# Patient Record
(demographics unavailable — no encounter records)

---

## 2024-10-31 NOTE — HISTORY OF PRESENT ILLNESS
[FreeTextEntry1] : Chief complaint incontinence Prolapsed uterus  Will see Dr Cantrell [de-identified] : Needs mammogram and ultrasound  No medication. Uses Vitamins

## 2024-10-31 NOTE — ASSESSMENT
[FreeTextEntry1] : Stable exam; Will get all labs' Mammogram and US of Breast Also US abdomen and Pelvis  Further plans after review of studies

## 2024-10-31 NOTE — HEALTH RISK ASSESSMENT
[Good] : ~his/her~  mood as  good [Yes] : Yes [No falls in past year] : Patient reported no falls in the past year [0] : 2) Feeling down, depressed, or hopeless: Not at all (0) [[id number]] : 0

## 2024-11-14 NOTE — ASSESSMENT
[FreeTextEntry1] : 75 yo female with prolapse.  Options for management of pelvic organ prolapse discussed at length with the patient today. Conservative options including total for physical therapy and pessary were discussed with the patient. Surgical options discussed with the patient including both transabdominal and transvaginal routes with or without mesh augmentation were discussed with the patient. Both laparoscopic robotic and open options discussed with the transabdominal route. FDA advisory for the use of mesh and prolapse surgery were discussed with the patient at length. RBAPC of our procedure was discussed at length appeared. Risks including, but not limited to, injury to the bladder, injury to the urethra, injury to bowel, injury to nerves and vascular structures, bleeding, infections, persistent or recurrent prolapse, pelvic/abdominal/vaginal pain, dyspareunia, fistula formation, ureteral injury, and dysfunctional voiding were discussed with the patient at length. After all the above was discussed and all questions answered she decided to move forward with a pessary trial.    The total amount of time I have personally spent preparing for this visit, reviewing the patient's test results, obtaining external history, ordering tests/medications, documenting clinical information, communicating with and counseling the patient/family and/or caregiver(s), reviewing old records, and spent face to face with the patient explaining the above was 45 minutes.

## 2024-11-14 NOTE — HISTORY OF PRESENT ILLNESS
[FreeTextEntry1] : 76F presents for initial evaluation of prolapse and incontinence Referred by Dr. Wise   PMH significant for: none PSH significant for: none Significant meds: none   Patient reports bladder is falling down. Noticed it a few months ago. Feels a bulge in the vagina, splinting.  Reports having urgency when bladder is full, otherwise she sometimes has UUI. No JUANIS. No gross hematuria. No hx of kidney stones. No hx of recurrent UTIs. Currently taking Cipro for UTI that was found on PE by PCP.  Daytime Frequency: 3 Nighttime Frequency: 1 Pads per day: 1 Straining to void: no Subjective Incomplete Emptying: no    Heaviness: yes Hysterectomy: no Pain with [place]: no  Daily Fluid Total: 2 glasses of water Daily Caffeine Total: 1 cup   Fecal Incontinence/Constipation: 1 BM/daily

## 2024-11-14 NOTE — HISTORY OF PRESENT ILLNESS
[FreeTextEntry1] : 76F presents for initial evaluation of prolapse and incontinence Referred by Dr. Wise   PMH significant for: none PSH significant for: none Significant meds: none   Patient reports bladder is falling down. Noticed it a few months ago. Feels a bulge in the vagina, splinting.  Reports having urgency when bladder is full, otherwise she sometimes has UUI. No JUANIS. No gross hematuria. No hx of kidney stones. No hx of recurrent UTIs. Currently taking Cipro for UTI that was found on PE by PCP.  Daytime Frequency: 3 Nighttime Frequency: 1 Pads per day: 1 Straining to void: no Subjective Incomplete Emptying: no    Heaviness: yes Hysterectomy: no Pain with [place]: no  Daily Fluid Total: 2 glasses of water Daily Caffeine Total: 1 cup   Fecal Incontinence/Constipation: 1 BM/daily

## 2024-12-09 NOTE — PHYSICAL EXAM
[General Appearance - Well Developed] : well developed [General Appearance - Well Nourished] : well nourished [Normal Appearance] : normal appearance [Well Groomed] : well groomed [General Appearance - In No Acute Distress] : no acute distress [] : no respiratory distress [Exaggerated Use Of Accessory Muscles For Inspiration] : no accessory muscle use [Normal Station and Gait] : the gait and station were normal for the patient's age [Skin Color & Pigmentation] : normal skin color and pigmentation [No Focal Deficits] : no focal deficits [Oriented To Time, Place, And Person] : oriented to person, place, and time [Affect] : the affect was normal [Mood] : the mood was normal [Not Anxious] : not anxious [Chaperone Present] : A chaperone was present in the examining room during all aspects of the physical examination [70672] : A chaperone was present during the pelvic exam. [FreeTextEntry2] : Melissa Fernandez MA

## 2024-12-09 NOTE — HISTORY OF PRESENT ILLNESS
[FreeTextEntry1] : 24--76-year-old female with pelvic organ prolapse, UUI. Here today for pessary fitting.   24--76F presents for initial evaluation of prolapse and incontinence Referred by Dr. Wise  PMH significant for: none PSH significant for: none Significant meds: none  Patient reports bladder is falling down. Noticed it a few months ago. Feels a bulge in the vagina, splinting. Reports having urgency when bladder is full, otherwise she sometimes has UUI. No JUANIS. No gross hematuria. No hx of kidney stones. No hx of recurrent UTIs. Currently taking Cipro for UTI that was found on PE by PCP.  Daytime Frequency: 3 Nighttime Frequency: 1 Pads per day: 1 Straining to void: no Subjective Incomplete Emptying: no   Heaviness: yes Hysterectomy: no Pain with [place]: no  Daily Fluid Total: 2 glasses of water Daily Caffeine Total: 1 cup  Fecal Incontinence/Constipation: 1 BM/daily

## 2024-12-09 NOTE — HISTORY OF PRESENT ILLNESS
[FreeTextEntry1] : 24--76-year-old female with pelvic organ prolapse, UUI. Here today for pessary fitting.   24--76F presents for initial evaluation of prolapse and incontinence Referred by Dr. Wise  PMH significant for: none PSH significant for: none Significant meds: none  Patient reports bladder is falling down. Noticed it a few months ago. Feels a bulge in the vagina, splinting. Reports having urgency when bladder is full, otherwise she sometimes has UUI. No JUANIS. No gross hematuria. No hx of kidney stones. No hx of recurrent UTIs. Currently taking Cipro for UTI that was found on PE by PCP.  Daytime Frequency: 3 Nighttime Frequency: 1 Pads per day: 1 Straining to void: no Subjective Incomplete Emptying: no   Heaviness: yes Hysterectomy: no Pain with [place]: no  Daily Fluid Total: 2 glasses of water Daily Caffeine Total: 1 cup  Fecal Incontinence/Constipation: 1 BM/daily

## 2024-12-09 NOTE — ASSESSMENT
[FreeTextEntry1] : 76-year-old female with POP, UUI.  Fitted with a size #5 pessary today. Pessary remained in place with voiding and ambulation. No pain or discomfort.   F/u in 1 week for pessary check

## 2024-12-09 NOTE — PHYSICAL EXAM
[General Appearance - Well Developed] : well developed [General Appearance - Well Nourished] : well nourished [Normal Appearance] : normal appearance [Well Groomed] : well groomed [General Appearance - In No Acute Distress] : no acute distress [] : no respiratory distress [Exaggerated Use Of Accessory Muscles For Inspiration] : no accessory muscle use [Normal Station and Gait] : the gait and station were normal for the patient's age [Skin Color & Pigmentation] : normal skin color and pigmentation [No Focal Deficits] : no focal deficits [Oriented To Time, Place, And Person] : oriented to person, place, and time [Affect] : the affect was normal [Mood] : the mood was normal [Not Anxious] : not anxious [Chaperone Present] : A chaperone was present in the examining room during all aspects of the physical examination [[id number]] : A chaperone was present during the pelvic exam. [FreeTextEntry2] : Melissa Fernandez MA

## 2025-03-11 NOTE — HEALTH RISK ASSESSMENT
[No] : No [No falls in past year] : Patient reported no falls in the past year [0] : 2) Feeling down, depressed, or hopeless: Not at all (0) [[id number]] : 0

## 2025-03-11 NOTE — HEALTH RISK ASSESSMENT
[No] : No [No falls in past year] : Patient reported no falls in the past year [0] : 2) Feeling down, depressed, or hopeless: Not at all (0) [[id number]] : 0

## 2025-05-08 NOTE — ADDENDUM
[FreeTextEntry1] : A portion of this note was written by [Kodi Romero] on 05/08/2025 acting as a scribe for Dr. Cantrell.   I have personally reviewed the chart and agree that the record accurately reflects my personal performance of the history, physical exam, assessment, and plan.

## 2025-05-08 NOTE — ASSESSMENT
[FreeTextEntry1] : I discussed the findings and options with Ms. JESSICA TIWARI in detail.   - Patient fitted with size 4 pessary.  - Urine was sent for culture to r/o infection.  - Consider transvaginal estrogen cream.   F/u with gyn- pt due for women's wellness appt.  Return in office 2 weeks for pessary check. Patient expressed understanding.

## 2025-05-08 NOTE — PHYSICAL EXAM
[General Appearance - Well Developed] : well developed [General Appearance - Well Nourished] : well nourished [Normal Appearance] : normal appearance [Well Groomed] : well groomed [General Appearance - In No Acute Distress] : no acute distress [] : no respiratory distress [Exaggerated Use Of Accessory Muscles For Inspiration] : no accessory muscle use [Normal Station and Gait] : the gait and station were normal for the patient's age [Skin Color & Pigmentation] : normal skin color and pigmentation [No Focal Deficits] : no focal deficits [Oriented To Time, Place, And Person] : oriented to person, place, and time [Affect] : the affect was normal [Mood] : the mood was normal [Not Anxious] : not anxious [FreeTextEntry2] : Melissa Fernandez MA

## 2025-05-08 NOTE — HISTORY OF PRESENT ILLNESS
[FreeTextEntry1] : 2025 -- 76 F with pelvic organ prolapse, UUI.  Patient fitted with size 5 pessary on her last visit .  The patient has had the pessary removed- "feels too big". Reports vaginal irritation with some burning sensation. No discharge. Denies h/o yeast infection.   Reports herpes flare >couple weeks ago, states she has some relief with a+d ointment.  Patient states she has not had any recent gynecological appointment.  CT pelvis without contrast with Dr. Anguiano-- Moderate pelvic prolapse.  note- pt states the CT was ordered as per her request.    24--76-year-old female with pelvic organ prolapse, UUI. Here today for pessary fitting.   24--76F presents for initial evaluation of prolapse and incontinence Referred by Dr. Wise  PMH significant for: none PSH significant for: none Significant meds: none  Patient reports bladder is falling down. Noticed it a few months ago. Feels a bulge in the vagina, splinting. Reports having urgency when bladder is full, otherwise she sometimes has UUI. No JUANIS. No gross hematuria. No hx of kidney stones. No hx of recurrent UTIs. Currently taking Cipro for UTI that was found on PE by PCP.  Daytime Frequency: 3 Nighttime Frequency: 1 Pads per day: 1 Straining to void: no Subjective Incomplete Emptying: no   Heaviness: yes Hysterectomy: no Pain with [place]: no  Daily Fluid Total: 2 glasses of water Daily Caffeine Total: 1 cup  Fecal Incontinence/Constipation: 1 BM/daily

## 2025-05-08 NOTE — HISTORY OF PRESENT ILLNESS
[FreeTextEntry1] : 2025 -- 76 F with pelvic organ prolapse, UUI.  Patient fitted with size 5 pessary on her last visit .  The patient has had the pessary removed- "feels too big". Reports vaginal irritation with some burning sensation. No discharge. Denies h/o yeast infection.   Reports herpes flare >couple weeks ago, states she has some relief with a+d ointment.  Patient states she has not had any recent gynecological appointment.  CT pelvis without contrast with Dr. Anguiano-- Moderate pelvic prolapse.  note- pt states the CT was ordered as per her request.    24--76-year-old female with pelvic organ prolapse, UUI. Here today for pessary fitting.   24--76F presents for initial evaluation of prolapse and incontinence Referred by Dr. Wise  PMH significant for: none PSH significant for: none Significant meds: none  Patient reports bladder is falling down. Noticed it a few months ago. Feels a bulge in the vagina, splinting. Reports having urgency when bladder is full, otherwise she sometimes has UUI. No JUANIS. No gross hematuria. No hx of kidney stones. No hx of recurrent UTIs. Currently taking Cipro for UTI that was found on PE by PCP.  Daytime Frequency: 3 Nighttime Frequency: 1 Pads per day: 1 Straining to void: no Subjective Incomplete Emptying: no   Heaviness: yes Hysterectomy: no Pain with [place]: no  Daily Fluid Total: 2 glasses of water Daily Caffeine Total: 1 cup  Fecal Incontinence/Constipation: 1 BM/daily

## 2025-05-22 NOTE — HISTORY OF PRESENT ILLNESS
[FreeTextEntry1] : 25-76 F with pelvic organ prolapse, UUI.  Here today for pessary fitting. She previously had a size #5 pessary in place. Removed due to discomfort. She would like to try a smaller size.  She reported vaginal irritation at her last visit. Genital culture showed Staphylococcus aureus. Today she has similar symptoms.  UCx positive for Enterococcus Faecalis on 25. She was treated with a course of Ciprofloxacin. Symptoms have improved. She would like a test of cure.   2025 -- 76 F with pelvic organ prolapse, UUI. Patient fitted with size 5 pessary on her last visit . The patient has had the pessary removed- "feels too big". Reports vaginal irritation with some burning sensation. No discharge. Denies h/o yeast infection. Reports herpes flare >couple weeks ago, states she has some relief with a+d ointment. Patient states she has not had any recent gynecological appointment.  CT pelvis without contrast with Dr. Anguiano-- Moderate pelvic prolapse. note- pt states the CT was ordered as per her request.   24--76-year-old female with pelvic organ prolapse, UUI. Here today for pessary fitting.  24--76F presents for initial evaluation of prolapse and incontinence Referred by Dr. Wise  PMH significant for: none PSH significant for: none Significant meds: none  Patient reports bladder is falling down. Noticed it a few months ago. Feels a bulge in the vagina, splinting. Reports having urgency when bladder is full, otherwise she sometimes has UUI. No JUANIS. No gross hematuria. No hx of kidney stones. No hx of recurrent UTIs. Currently taking Cipro for UTI that was found on PE by PCP.  Daytime Frequency: 3 Nighttime Frequency: 1 Pads per day: 1 Straining to void: no Subjective Incomplete Emptying: no   Heaviness: yes Hysterectomy: no Pain with [place]: no  Daily Fluid Total: 2 glasses of water Daily Caffeine Total: 1 cup  Fecal Incontinence/Constipation: 1 BM/daily

## 2025-05-22 NOTE — ASSESSMENT
[FreeTextEntry1] : I discussed the findings and options with Ms. JESSICA TIWARI in detail.  -Patient fitted with a size #4 ring pessary.  -Genital culture sent due to vaginal irritation.  -Urine culture sent for test of cure as requested.  -F/u in 1 week for pessary check. RTC sooner as needed.

## 2025-05-22 NOTE — HISTORY OF PRESENT ILLNESS
[FreeTextEntry1] : 25-76 F with pelvic organ prolapse, UUI.  Here today for pessary fitting. She previously had a size #5 pessary in place. Removed due to discomfort. She would like to try a smaller size.  She reported vaginal irritation at her last visit. Genital culture showed Staphylococcus aureus. Today she has similar symptoms.  UCx positive for Enterococcus Faecalis on 25. She was treated with a course of Ciprofloxacin. Symptoms have improved. She would like a test of cure.   2025 -- 76 F with pelvic organ prolapse, UUI. Patient fitted with size 5 pessary on her last visit . The patient has had the pessary removed- "feels too big". Reports vaginal irritation with some burning sensation. No discharge. Denies h/o yeast infection. Reports herpes flare >couple weeks ago, states she has some relief with a+d ointment. Patient states she has not had any recent gynecological appointment.  CT pelvis without contrast with Dr. Anguiano-- Moderate pelvic prolapse. note- pt states the CT was ordered as per her request.   24--76-year-old female with pelvic organ prolapse, UUI. Here today for pessary fitting.  24--76F presents for initial evaluation of prolapse and incontinence Referred by Dr. Wise  PMH significant for: none PSH significant for: none Significant meds: none  Patient reports bladder is falling down. Noticed it a few months ago. Feels a bulge in the vagina, splinting. Reports having urgency when bladder is full, otherwise she sometimes has UUI. No JUANIS. No gross hematuria. No hx of kidney stones. No hx of recurrent UTIs. Currently taking Cipro for UTI that was found on PE by PCP.  Daytime Frequency: 3 Nighttime Frequency: 1 Pads per day: 1 Straining to void: no Subjective Incomplete Emptying: no   Heaviness: yes Hysterectomy: no Pain with [place]: no  Daily Fluid Total: 2 glasses of water Daily Caffeine Total: 1 cup  Fecal Incontinence/Constipation: 1 BM/daily

## 2025-05-22 NOTE — PHYSICAL EXAM
[General Appearance - Well Developed] : well developed [General Appearance - Well Nourished] : well nourished [Normal Appearance] : normal appearance [Well Groomed] : well groomed [General Appearance - In No Acute Distress] : no acute distress [] : no respiratory distress [Exaggerated Use Of Accessory Muscles For Inspiration] : no accessory muscle use [Normal Station and Gait] : the gait and station were normal for the patient's age [Skin Color & Pigmentation] : normal skin color and pigmentation [No Focal Deficits] : no focal deficits [Oriented To Time, Place, And Person] : oriented to person, place, and time [Affect] : the affect was normal [Mood] : the mood was normal [Not Anxious] : not anxious [MA] : MA [FreeTextEntry2] : Keturah Wiley

## 2025-06-02 NOTE — PHYSICAL EXAM
[General Appearance - Well Developed] : well developed [General Appearance - Well Nourished] : well nourished [Normal Appearance] : normal appearance [Well Groomed] : well groomed [General Appearance - In No Acute Distress] : no acute distress [] : no respiratory distress [Exaggerated Use Of Accessory Muscles For Inspiration] : no accessory muscle use [Normal Station and Gait] : the gait and station were normal for the patient's age [Skin Color & Pigmentation] : normal skin color and pigmentation [Oriented To Time, Place, And Person] : oriented to person, place, and time [Affect] : the affect was normal [Mood] : the mood was normal [Not Anxious] : not anxious [MA] : MA [FreeTextEntry2] : Mary Horner MA

## 2025-06-02 NOTE — HISTORY OF PRESENT ILLNESS
[FreeTextEntry1] : 25--76-year-old female with pelvic organ prolapse, UUI. Size # 4 pessary placed at her last visit. She removed the pessary at home. Here today requesting a smaller size. She believes it will be more comfortable.   25--76 F with pelvic organ prolapse, UUI. Here today for pessary fitting. She previously had a size #5 pessary in place. Removed due to discomfort. She would like to try a smaller size. She reported vaginal irritation at her last visit. Genital culture showed Staphylococcus aureus. Today she has similar symptoms. UCx positive for Enterococcus Faecalis on 25. She was treated with a course of Ciprofloxacin. Symptoms have improved. She would like a test of cure.  2025 -- 76 F with pelvic organ prolapse, UUI. Patient fitted with size 5 pessary on her last visit . The patient has had the pessary removed- "feels too big". Reports vaginal irritation with some burning sensation. No discharge. Denies h/o yeast infection. Reports herpes flare >couple weeks ago, states she has some relief with a+d ointment. Patient states she has not had any recent gynecological appointment.  CT pelvis without contrast with Dr. Anguiano-- Moderate pelvic prolapse. note- pt states the CT was ordered as per her request.   24--76-year-old female with pelvic organ prolapse, UUI. Here today for pessary fitting.  24--76F presents for initial evaluation of prolapse and incontinence Referred by Dr. Wise  PMH significant for: none PSH significant for: none Significant meds: none  Patient reports bladder is falling down. Noticed it a few months ago. Feels a bulge in the vagina, splinting. Reports having urgency when bladder is full, otherwise she sometimes has UUI. No JUANIS. No gross hematuria. No hx of kidney stones. No hx of recurrent UTIs. Currently taking Cipro for UTI that was found on PE by PCP.  Daytime Frequency: 3 Nighttime Frequency: 1 Pads per day: 1 Straining to void: no Subjective Incomplete Emptying: no   Heaviness: yes Hysterectomy: no Pain with [place]: no  Daily Fluid Total: 2 glasses of water Daily Caffeine Total: 1 cup  Fecal Incontinence/Constipation: 1 BM/daily

## 2025-06-02 NOTE — ASSESSMENT
[FreeTextEntry1] : Impression/Plan: 76-year-old female with POP, UUI.   -Size #3 ring pessary placed as requested. Informed patient that it may not provide as much support as a bigger size. She is comfortable and would like to continue with this size.  -F/u in 3 months for pessary check.

## 2025-07-17 NOTE — ASSESSMENT
[FreeTextEntry1] : I discussed the findings and options with Ms. JESSICA TIWARI in detail.    Grade 2-3 cystocele. Grade 1-2 rectocele.  Reviewed surgical options.  Pt interested in anterior native tissue repair +/- sling based on UDS findings.   - TVUS ordered today, and the appropriate referral/requisition was provided.  - UA/UC - Genital culture.   Return in office for urodynamics. Patient expressed understanding.    The total amount of time I have personally spent preparing for this visit, reviewing the patient's test results, obtaining external history, ordering tests/medications, documenting clinical information, communicating with and counseling the patient/family and/or caregiver(s), reviewing old records, and spent face to face with the patient explaining the above was 35 minutes.

## 2025-07-17 NOTE — ADDENDUM
[FreeTextEntry1] : A portion of this note was written by [Kodi Romero] on 07/15/2025 acting as a scribe for Dr. Cantrell.   I have personally reviewed the chart and agree that the record accurately reflects my personal performance of the history, physical exam, assessment, and plan.

## 2025-07-17 NOTE — HISTORY OF PRESENT ILLNESS
[FreeTextEntry1] : 07/15/2025 --   25--76-year-old female with pelvic organ prolapse, UUI. Size # 4 pessary placed at her last visit. She removed the pessary at home- she does not like using the pessary.   25--76 F with pelvic organ prolapse, UUI. Here today for pessary fitting. She previously had a size #5 pessary in place. Removed due to discomfort. She would like to try a smaller size. She reported vaginal irritation at her last visit. Genital culture showed Staphylococcus aureus. Today she has similar symptoms. UCx positive for Enterococcus Faecalis on 25. She was treated with a course of Ciprofloxacin. Symptoms have improved. She would like a test of cure.  2025 -- 76 F with pelvic organ prolapse, UUI. Patient fitted with size 5 pessary on her last visit . The patient has had the pessary removed- "feels too big". Reports vaginal irritation with some burning sensation. No discharge. Denies h/o yeast infection. Reports herpes flare >couple weeks ago, states she has some relief with a+d ointment. Patient states she has not had any recent gynecological appointment.  CT pelvis without contrast with Dr. Anguiano-- Moderate pelvic prolapse. note- pt states the CT was ordered as per her request.   24--76-year-old female with pelvic organ prolapse, UUI. Here today for pessary fitting.  24--76F presents for initial evaluation of prolapse and incontinence Referred by Dr. Wise  PMH significant for: none PSH significant for: none Significant meds: none  Patient reports bladder is falling down. Noticed it a few months ago. Feels a bulge in the vagina, splinting. Reports having urgency when bladder is full, otherwise she sometimes has UUI. No JUANIS. No gross hematuria. No hx of kidney stones. No hx of recurrent UTIs. Currently taking Cipro for UTI that was found on PE by PCP.  Daytime Frequency: 3 Nighttime Frequency: 1 Pads per day: 1 Straining to void: no Subjective Incomplete Emptying: no   Heaviness: yes Hysterectomy: no Pain with [place]: no  Daily Fluid Total: 2 glasses of water Daily Caffeine Total: 1 cup  Fecal Incontinence/Constipation: 1 BM/daily